# Patient Record
Sex: MALE | Race: BLACK OR AFRICAN AMERICAN
[De-identification: names, ages, dates, MRNs, and addresses within clinical notes are randomized per-mention and may not be internally consistent; named-entity substitution may affect disease eponyms.]

---

## 2019-07-16 ENCOUNTER — HOSPITAL ENCOUNTER (EMERGENCY)
Dept: HOSPITAL 72 - EMR | Age: 29
Discharge: HOME | End: 2019-07-16
Payer: SELF-PAY

## 2019-07-16 VITALS — SYSTOLIC BLOOD PRESSURE: 112 MMHG | DIASTOLIC BLOOD PRESSURE: 72 MMHG

## 2019-07-16 VITALS — HEIGHT: 66 IN | BODY MASS INDEX: 24.27 KG/M2 | WEIGHT: 151 LBS

## 2019-07-16 VITALS — DIASTOLIC BLOOD PRESSURE: 68 MMHG | SYSTOLIC BLOOD PRESSURE: 115 MMHG

## 2019-07-16 DIAGNOSIS — J20.9: Primary | ICD-10-CM

## 2019-07-16 DIAGNOSIS — F17.200: ICD-10-CM

## 2019-07-16 PROCEDURE — 99282 EMERGENCY DEPT VISIT SF MDM: CPT

## 2019-07-16 NOTE — EMERGENCY ROOM REPORT
History of Present Illness


General


Chief Complaint:  Upper Respiratory Illness


Source:  Patient





Present Illness


HPI


29-year-old male with no significant past medical history here complaining of 2-

1/2 weeks of cough and congestion.  Patient reports that his symptoms started 

with fever and chills, sore throat now has yellow and green phlegm when he 

coughs.  Patient is a tobacco smoker and reports wheezing at nighttime.  Has 

taken over-the-counter cough medication with minimal relief.  Denies sick 

contacts recent travel.  Denies chest pain, shortness of breath, palpitation, 

abdominal pain, nausea vomiting, and other associated symptoms.


Allergies:  


Coded Allergies:  


     No Known Allergies (Unverified , 7/16/19)





Patient History


Past Medical History:  see triage record


Past Surgical History:  unable to obtain


Pertinent Family History:  none


Immunizations:  UTD


Reviewed Nursing Documentation:  PMH: Agreed; PSxH: Agreed





Nursing Documentation-PMH


Past Medical History:  No Stated History





Review of Systems


All Other Systems:  negative except mentioned in HPI





Physical Exam





Vital Signs








  Date Time  Temp Pulse Resp B/P (MAP) Pulse Ox O2 Delivery O2 Flow Rate FiO2


 


7/16/19 16:53 98.1 67 16 112/72 (85) 99 Room Air  








Sp02 EP Interpretation:  reviewed, normal


General Appearance:  normal inspection, well appearing, alert


Head:  normocephalic, atraumatic


Eyes:  bilateral eye normal inspection, bilateral eye PERRL


ENT:  normal pharynx, no angioedema, normal voice, TMs + canals normal, uvula 

midline


Neck:  normal inspection, full range of motion, supple


Respiratory:  normal inspection, chest non-tender, lungs clear, normal breath 

sounds, no rhonchi, no respiratory distress, no retraction, no wheezing


Cardiovascular #1:  normal inspection, normal peripheral pulses, no murmur


Gastrointestinal:  normal inspection, non tender, soft


Rectal:  deferred


Genitourinary:  no CVA tenderness


Musculoskeletal:  normal inspection, back normal


Neurologic:  normal inspection, alert, oriented x3, responsive


Psychiatric:  normal inspection, judgement/insight normal, memory normal


Skin:  no rash


Lymphatic:  normal inspection, no adenopathy





Medical Decision Making


PA Attestation


All my diagnosis and treatment plans were reviewed ad discussed with my 

supervising physician Dr. Cook


Diagnostic Impression:  


 Primary Impression:  


 Bronchitis


ER Course


29-year-old male with no significant past medical history here complaining of 2-

1/2 weeks of cough and congestion.  Patient reports that his symptoms started 

with fever and chills, sore throat now has yellow and green phlegm when he 

coughs.  Patient is a tobacco smoker and reports wheezing at nighttime.  Has 

taken over-the-counter cough medication with minimal relief.  Denies sick 

contacts recent travel.  Denies chest pain, shortness of breath, palpitation, 

abdominal pain, nausea vomiting, and other associated symptoms.





Ddx considered but are not limited to: bronchitis, PNA, URI viral, bacterial 

bronchitis 





Vital signs: are WNL, pt. is afebrile





H&PE are most consistent with: Bacterial bronchitis due to patient heavy 

tobacco smoke





ORDERS: Azithromycin, Phenergan, albuterol inhaler





ED INTERVENTIONS: None required at this time.








DISCHARGE: At this time pt. is stable for d/c to home. Will provide printed 

patient care instructions, and any necessary prescriptions. Care plan and 

follow up instructions have been discussed with the patient prior to discharge.

  Avoid smoking follow-up with a primary care provider if worsening symptoms 

return to the emergency room





Last Vital Signs








  Date Time  Temp Pulse Resp B/P (MAP) Pulse Ox O2 Delivery O2 Flow Rate FiO2


 


7/16/19 16:53 98.1 67 16 112/72 (85) 99 Room Air  








Disposition:  HOME, SELF-CARE


Condition:  Stable


Scripts


Albuterol Sulfate (VENTOLIN HFA) 18 Gm [email or web address]


2 PUFFS INH EVERY 6 HOURS, #18 GM 0 Refills


   Prov: Krista Marshall         7/16/19 


Promethazine Hcl (PROMETHAZINE HCL*) 6.25 Mg/5 Ml Syrup


5 ML ORAL Q6H, #120 ML 0 Refills


   Prov: Krista Marshall         7/16/19 


Azithromycin* (ZITHROMAX*) 250 Mg Tablet


250 MG ORAL DAILY, #6 TAB 0 Refills


   Take two tables once daily for 1 day, then one tablet once daily for 4


   days.


   Prov: Krista Marshall         7/16/19


Patient Instructions:  Acute Bronchitis, Easy-to-Read





Additional Instructions:  


Take medication as directed follow-up with primary care provider avoid smoking











Krista Marshall Jul 16, 2019 17:03

## 2019-07-16 NOTE — NUR
ED Nurse Note:

Pt. AAOx4. Ambulatory. walked in to ER due to c/o cough, sore throat and nasal 
congestion for 3 weeks no fever reported. no wheezing noted to auscultation